# Patient Record
Sex: FEMALE | Race: WHITE | NOT HISPANIC OR LATINO | RURAL
[De-identification: names, ages, dates, MRNs, and addresses within clinical notes are randomized per-mention and may not be internally consistent; named-entity substitution may affect disease eponyms.]

---

## 2024-03-15 ENCOUNTER — OV NP (OUTPATIENT)
Dept: RURAL CLINIC 8 | Facility: CLINIC | Age: 79
End: 2024-03-15

## 2024-06-05 ENCOUNTER — P2P PATIENT RECORD (OUTPATIENT)
Age: 79
End: 2024-06-05

## 2024-06-24 ENCOUNTER — LAB OUTSIDE AN ENCOUNTER (OUTPATIENT)
Dept: RURAL CLINIC 2 | Facility: CLINIC | Age: 79
End: 2024-06-24

## 2024-06-24 ENCOUNTER — DASHBOARD ENCOUNTERS (OUTPATIENT)
Age: 79
End: 2024-06-24

## 2024-06-24 ENCOUNTER — OFFICE VISIT (OUTPATIENT)
Dept: RURAL CLINIC 2 | Facility: CLINIC | Age: 79
End: 2024-06-24
Payer: MEDICARE

## 2024-06-24 VITALS
TEMPERATURE: 98 F | SYSTOLIC BLOOD PRESSURE: 104 MMHG | HEART RATE: 77 BPM | DIASTOLIC BLOOD PRESSURE: 69 MMHG | WEIGHT: 105 LBS | BODY MASS INDEX: 15.55 KG/M2 | HEIGHT: 69 IN

## 2024-06-24 DIAGNOSIS — Z79.01 CHRONIC ANTICOAGULATION: ICD-10-CM

## 2024-06-24 DIAGNOSIS — D50.8 OTHER IRON DEFICIENCY ANEMIA: ICD-10-CM

## 2024-06-24 DIAGNOSIS — R63.4 WEIGHT LOSS, UNINTENTIONAL: ICD-10-CM

## 2024-06-24 PROBLEM — 698247007: Status: ACTIVE | Noted: 2024-06-24

## 2024-06-24 PROBLEM — 448765001: Status: ACTIVE | Noted: 2024-06-24

## 2024-06-24 PROBLEM — 711150003: Status: ACTIVE | Noted: 2024-06-24

## 2024-06-24 PROBLEM — 87522002: Status: ACTIVE | Noted: 2024-06-24

## 2024-06-24 PROCEDURE — 99204 OFFICE O/P NEW MOD 45 MIN: CPT | Performed by: NURSE PRACTITIONER

## 2024-06-24 RX ORDER — METOPROLOL SUCCINATE 50 MG/1
TAKE 1 TABLET BY MOUTH EVERY NIGHT TABLET, FILM COATED, EXTENDED RELEASE ORAL
Qty: 90 EACH | Refills: 0 | Status: ACTIVE | COMMUNITY

## 2024-06-24 RX ORDER — POLYETHYLENE GLYCOL 3350, SODIUM SULFATE, POTASSIUM CHLORIDE, MAGNESIUM SULFATE, AND SODIUM CHLORIDE FOR ORAL SOLUTION 178.7-7.3G
356 ML KIT ORAL 2
Qty: 2 | Refills: 0 | OUTPATIENT
Start: 2024-06-24 | End: 2024-06-25

## 2024-06-24 RX ORDER — IRON FUM,PS CMP/VIT C/NIACIN 125-40-3MG
CAPSULE ORAL
Qty: 30 CAPSULE | Status: ACTIVE | COMMUNITY

## 2024-06-24 RX ORDER — PANCRELIPASE 36000; 180000; 114000 [USP'U]/1; [USP'U]/1; [USP'U]/1
CAPSULE, DELAYED RELEASE PELLETS ORAL
Qty: 200 CAPSULE | Status: ACTIVE | COMMUNITY

## 2024-06-24 RX ORDER — ASPIRIN 81 MG/1
1 TABLET TABLET, CHEWABLE ORAL ONCE A DAY
Status: ACTIVE | COMMUNITY

## 2024-06-24 RX ORDER — GABAPENTIN 100 MG/1
CAPSULE ORAL
Qty: 90 CAPSULE | Status: ACTIVE | COMMUNITY

## 2024-06-24 RX ORDER — APIXABAN 2.5 MG/1
TAKE 1 TABLET BY MOUTH EVERY MORNING AND EVERY NIGHT AT BEDTIME TABLET, FILM COATED ORAL
Qty: 60 EACH | Refills: 0 | Status: ACTIVE | COMMUNITY

## 2024-06-24 NOTE — HPI-ZZZTODAY'S VISIT
The patient is an 78-year-old female accompanied by her spouse and referred by Dr. Destiny Pacheco for iron deficiency anemia and weight loss.  A copy of this document to be sent to the referring provider.  The patient has lost 18 pounds over the past year.  She said her appetite has not changed, no complaints of acid reflux, heartburn or abdominal pain.  She has had a colonoscopy in the past and was over 10 years ago and is unsure if there was a history of colon polyps.  Past medical history of stage I breast cancer diagnosed in 2015 followed with radiation therapy.  She has pancreatic insufficiency and takes enzymes with her meals.  She is followed by Dr. Cuello for irregular heartbeat and is on Eliquis.